# Patient Record
Sex: MALE | URBAN - METROPOLITAN AREA
[De-identification: names, ages, dates, MRNs, and addresses within clinical notes are randomized per-mention and may not be internally consistent; named-entity substitution may affect disease eponyms.]

---

## 2020-11-24 LAB — SARS-COV-2 N GENE NPH QL NAA+PROBE: NOT DETECTED

## 2022-03-03 ENCOUNTER — INPATIENT (INPATIENT)
Facility: HOSPITAL | Age: 27
LOS: 1 days | Discharge: HOME | DRG: 393 | End: 2022-03-05
Attending: SURGERY | Admitting: SURGERY
Payer: COMMERCIAL

## 2022-03-03 VITALS
WEIGHT: 134.92 LBS | SYSTOLIC BLOOD PRESSURE: 142 MMHG | HEIGHT: 71 IN | OXYGEN SATURATION: 97 % | HEART RATE: 84 BPM | DIASTOLIC BLOOD PRESSURE: 86 MMHG | TEMPERATURE: 97 F | RESPIRATION RATE: 18 BRPM

## 2022-03-03 DIAGNOSIS — K63.89 OTHER SPECIFIED DISEASES OF INTESTINE: ICD-10-CM

## 2022-03-03 DIAGNOSIS — Z98.890 OTHER SPECIFIED POSTPROCEDURAL STATES: Chronic | ICD-10-CM

## 2022-03-03 LAB
ALBUMIN SERPL ELPH-MCNC: 4.8 G/DL — SIGNIFICANT CHANGE UP (ref 3.3–5)
ALP SERPL-CCNC: 76 U/L — SIGNIFICANT CHANGE UP (ref 40–120)
ALT FLD-CCNC: 13 U/L — SIGNIFICANT CHANGE UP (ref 10–45)
ANION GAP SERPL CALC-SCNC: 13 MMOL/L — SIGNIFICANT CHANGE UP (ref 5–17)
APPEARANCE UR: CLEAR — SIGNIFICANT CHANGE UP
APTT BLD: 33.1 SEC — SIGNIFICANT CHANGE UP (ref 27.5–35.5)
AST SERPL-CCNC: 16 U/L — SIGNIFICANT CHANGE UP (ref 10–40)
BACTERIA # UR AUTO: 0 — SIGNIFICANT CHANGE UP
BASE EXCESS BLDV CALC-SCNC: -1.8 MMOL/L — SIGNIFICANT CHANGE UP (ref -2–2)
BASOPHILS # BLD AUTO: 0.02 K/UL — SIGNIFICANT CHANGE UP (ref 0–0.2)
BASOPHILS NFR BLD AUTO: 0.4 % — SIGNIFICANT CHANGE UP (ref 0–2)
BILIRUB SERPL-MCNC: 1.2 MG/DL — SIGNIFICANT CHANGE UP (ref 0.2–1.2)
BILIRUB UR-MCNC: NEGATIVE — SIGNIFICANT CHANGE UP
BLD GP AB SCN SERPL QL: NEGATIVE — SIGNIFICANT CHANGE UP
BUN SERPL-MCNC: 11 MG/DL — SIGNIFICANT CHANGE UP (ref 7–23)
CA-I SERPL-SCNC: 1.3 MMOL/L — SIGNIFICANT CHANGE UP (ref 1.15–1.33)
CALCIUM SERPL-MCNC: 9.8 MG/DL — SIGNIFICANT CHANGE UP (ref 8.4–10.5)
CHLORIDE BLDV-SCNC: 104 MMOL/L — SIGNIFICANT CHANGE UP (ref 96–108)
CHLORIDE SERPL-SCNC: 105 MMOL/L — SIGNIFICANT CHANGE UP (ref 96–108)
CO2 BLDV-SCNC: 24 MMOL/L — SIGNIFICANT CHANGE UP (ref 22–26)
CO2 SERPL-SCNC: 20 MMOL/L — LOW (ref 22–31)
COLOR SPEC: COLORLESS — SIGNIFICANT CHANGE UP
CREAT SERPL-MCNC: 0.97 MG/DL — SIGNIFICANT CHANGE UP (ref 0.5–1.3)
DIFF PNL FLD: NEGATIVE — SIGNIFICANT CHANGE UP
EGFR: 109 ML/MIN/1.73M2 — SIGNIFICANT CHANGE UP
EOSINOPHIL # BLD AUTO: 0.1 K/UL — SIGNIFICANT CHANGE UP (ref 0–0.5)
EOSINOPHIL NFR BLD AUTO: 2.1 % — SIGNIFICANT CHANGE UP (ref 0–6)
EPI CELLS # UR: 0 /HPF — SIGNIFICANT CHANGE UP
GAS PNL BLDV: 138 MMOL/L — SIGNIFICANT CHANGE UP (ref 136–145)
GAS PNL BLDV: SIGNIFICANT CHANGE UP
GLUCOSE BLDV-MCNC: 95 MG/DL — SIGNIFICANT CHANGE UP (ref 70–99)
GLUCOSE SERPL-MCNC: 102 MG/DL — HIGH (ref 70–99)
GLUCOSE UR QL: NEGATIVE — SIGNIFICANT CHANGE UP
HCO3 BLDV-SCNC: 23 MMOL/L — SIGNIFICANT CHANGE UP (ref 22–29)
HCT VFR BLD CALC: 43.5 % — SIGNIFICANT CHANGE UP (ref 39–50)
HCT VFR BLDA CALC: 45 % — SIGNIFICANT CHANGE UP (ref 39–51)
HGB BLD CALC-MCNC: 15 G/DL — SIGNIFICANT CHANGE UP (ref 12.6–17.4)
HGB BLD-MCNC: 14.8 G/DL — SIGNIFICANT CHANGE UP (ref 13–17)
HYALINE CASTS # UR AUTO: 0 /LPF — SIGNIFICANT CHANGE UP (ref 0–2)
IMM GRANULOCYTES NFR BLD AUTO: 0.4 % — SIGNIFICANT CHANGE UP (ref 0–1.5)
INR BLD: 1.23 RATIO — HIGH (ref 0.88–1.16)
KETONES UR-MCNC: NEGATIVE — SIGNIFICANT CHANGE UP
LACTATE BLDV-MCNC: 1.3 MMOL/L — SIGNIFICANT CHANGE UP (ref 0.7–2)
LEUKOCYTE ESTERASE UR-ACNC: NEGATIVE — SIGNIFICANT CHANGE UP
LIDOCAIN IGE QN: 17 U/L — SIGNIFICANT CHANGE UP (ref 7–60)
LYMPHOCYTES # BLD AUTO: 1.66 K/UL — SIGNIFICANT CHANGE UP (ref 1–3.3)
LYMPHOCYTES # BLD AUTO: 35.1 % — SIGNIFICANT CHANGE UP (ref 13–44)
MCHC RBC-ENTMCNC: 30.3 PG — SIGNIFICANT CHANGE UP (ref 27–34)
MCHC RBC-ENTMCNC: 34 GM/DL — SIGNIFICANT CHANGE UP (ref 32–36)
MCV RBC AUTO: 89.1 FL — SIGNIFICANT CHANGE UP (ref 80–100)
MONOCYTES # BLD AUTO: 0.54 K/UL — SIGNIFICANT CHANGE UP (ref 0–0.9)
MONOCYTES NFR BLD AUTO: 11.4 % — SIGNIFICANT CHANGE UP (ref 2–14)
NEUTROPHILS # BLD AUTO: 2.39 K/UL — SIGNIFICANT CHANGE UP (ref 1.8–7.4)
NEUTROPHILS NFR BLD AUTO: 50.6 % — SIGNIFICANT CHANGE UP (ref 43–77)
NITRITE UR-MCNC: NEGATIVE — SIGNIFICANT CHANGE UP
NRBC # BLD: 0 /100 WBCS — SIGNIFICANT CHANGE UP (ref 0–0)
PCO2 BLDV: 39 MMHG — LOW (ref 42–55)
PH BLDV: 7.38 — SIGNIFICANT CHANGE UP (ref 7.32–7.43)
PH UR: 7 — SIGNIFICANT CHANGE UP (ref 5–8)
PLATELET # BLD AUTO: 255 K/UL — SIGNIFICANT CHANGE UP (ref 150–400)
PO2 BLDV: 55 MMHG — HIGH (ref 25–45)
POTASSIUM BLDV-SCNC: 3.8 MMOL/L — SIGNIFICANT CHANGE UP (ref 3.5–5.1)
POTASSIUM SERPL-MCNC: 4 MMOL/L — SIGNIFICANT CHANGE UP (ref 3.5–5.3)
POTASSIUM SERPL-SCNC: 4 MMOL/L — SIGNIFICANT CHANGE UP (ref 3.5–5.3)
PROT SERPL-MCNC: 7.1 G/DL — SIGNIFICANT CHANGE UP (ref 6–8.3)
PROT UR-MCNC: NEGATIVE — SIGNIFICANT CHANGE UP
PROTHROM AB SERPL-ACNC: 14.3 SEC — HIGH (ref 10.5–13.4)
RBC # BLD: 4.88 M/UL — SIGNIFICANT CHANGE UP (ref 4.2–5.8)
RBC # FLD: 13.6 % — SIGNIFICANT CHANGE UP (ref 10.3–14.5)
RBC CASTS # UR COMP ASSIST: 0 /HPF — SIGNIFICANT CHANGE UP (ref 0–4)
RH IG SCN BLD-IMP: NEGATIVE — SIGNIFICANT CHANGE UP
SAO2 % BLDV: 85.8 % — SIGNIFICANT CHANGE UP (ref 67–88)
SARS-COV-2 RNA SPEC QL NAA+PROBE: SIGNIFICANT CHANGE UP
SODIUM SERPL-SCNC: 138 MMOL/L — SIGNIFICANT CHANGE UP (ref 135–145)
SP GR SPEC: 1.04 — SIGNIFICANT CHANGE UP (ref 1.01–1.02)
UROBILINOGEN FLD QL: NEGATIVE — SIGNIFICANT CHANGE UP
WBC # BLD: 4.73 K/UL — SIGNIFICANT CHANGE UP (ref 3.8–10.5)
WBC # FLD AUTO: 4.73 K/UL — SIGNIFICANT CHANGE UP (ref 3.8–10.5)
WBC UR QL: 0 /HPF — SIGNIFICANT CHANGE UP (ref 0–5)

## 2022-03-03 PROCEDURE — 99285 EMERGENCY DEPT VISIT HI MDM: CPT

## 2022-03-03 PROCEDURE — 99221 1ST HOSP IP/OBS SF/LOW 40: CPT

## 2022-03-03 PROCEDURE — 99232 SBSQ HOSP IP/OBS MODERATE 35: CPT

## 2022-03-03 PROCEDURE — 74177 CT ABD & PELVIS W/CONTRAST: CPT | Mod: 26,MA

## 2022-03-03 RX ORDER — SODIUM CHLORIDE 9 MG/ML
1000 INJECTION, SOLUTION INTRAVENOUS
Refills: 0 | Status: DISCONTINUED | OUTPATIENT
Start: 2022-03-03 | End: 2022-03-03

## 2022-03-03 RX ORDER — PIPERACILLIN AND TAZOBACTAM 4; .5 G/20ML; G/20ML
3.38 INJECTION, POWDER, LYOPHILIZED, FOR SOLUTION INTRAVENOUS ONCE
Refills: 0 | Status: COMPLETED | OUTPATIENT
Start: 2022-03-03 | End: 2022-03-03

## 2022-03-03 RX ORDER — PIPERACILLIN AND TAZOBACTAM 4; .5 G/20ML; G/20ML
3.38 INJECTION, POWDER, LYOPHILIZED, FOR SOLUTION INTRAVENOUS ONCE
Refills: 0 | Status: DISCONTINUED | OUTPATIENT
Start: 2022-03-03 | End: 2022-03-03

## 2022-03-03 RX ORDER — ENOXAPARIN SODIUM 100 MG/ML
40 INJECTION SUBCUTANEOUS EVERY 24 HOURS
Refills: 0 | Status: DISCONTINUED | OUTPATIENT
Start: 2022-03-03 | End: 2022-03-05

## 2022-03-03 RX ORDER — PIPERACILLIN AND TAZOBACTAM 4; .5 G/20ML; G/20ML
3.38 INJECTION, POWDER, LYOPHILIZED, FOR SOLUTION INTRAVENOUS EVERY 8 HOURS
Refills: 0 | Status: DISCONTINUED | OUTPATIENT
Start: 2022-03-03 | End: 2022-03-05

## 2022-03-03 RX ADMIN — PIPERACILLIN AND TAZOBACTAM 25 GRAM(S): 4; .5 INJECTION, POWDER, LYOPHILIZED, FOR SOLUTION INTRAVENOUS at 16:02

## 2022-03-03 RX ADMIN — PIPERACILLIN AND TAZOBACTAM 3.38 GRAM(S): 4; .5 INJECTION, POWDER, LYOPHILIZED, FOR SOLUTION INTRAVENOUS at 12:05

## 2022-03-03 RX ADMIN — PIPERACILLIN AND TAZOBACTAM 200 GRAM(S): 4; .5 INJECTION, POWDER, LYOPHILIZED, FOR SOLUTION INTRAVENOUS at 11:16

## 2022-03-03 NOTE — ED ADULT NURSE REASSESSMENT NOTE - NS ED NURSE REASSESS COMMENT FT1
Report given to receiving to CANDY Ward patient is in no acute distress. Patient vital signs stable, plan of care explained.

## 2022-03-03 NOTE — H&P ADULT - ASSESSMENT
26M presenting with ascending pneumatosis intestinalis w/ small area of contained perforation with free intraperitoneal air.    PLAN:  - Admit to Red Team Surgery under Dr. Phillip Mariscal.  - Clear liquid diet  - IV antibiotics: Zosyn  - ID consultation  - GI consultation  - Pain control as needed  - DVT ppx    Discussed with colorectal fellow.    MICHAELA Gavin, PGY-2   [facility]   Red Team Surgery   [id number]

## 2022-03-03 NOTE — ED ADULT NURSE NOTE - OBJECTIVE STATEMENT
Patient  is  alert  and oriented  x3. Color is good  and skin warm to touch  .  He  is c/o  mid abdominal pain and diarrhea  x 9 days .Patient  was recently admitted at  a  different  hospital  for  the  same  problem.  He  is  feeling  much  better now.

## 2022-03-03 NOTE — CONSULT NOTE ADULT - ASSESSMENT
26 year old, no recent travel.  looks remarkable well, normal labs and inflammatory markers.  minimal eosinophilia in outside labs.  currently asymptomatic  CT reviewed with radiology and discussed with Dr. GOMEZ.      The bowel looks normal and the air is adjacent to the ascending colon. The first concern would be IBD, but there is no diagnosis at present He does have a history of frequent bowel movements with blood in the past.    no travel    suggest     baseline QuantiFeron gold  stool for o and   P    GI PCR  negative in [place].   serology for trichomonas  and e. histolytic   continue zon  GI consult. 
27 yo M w/ no PMHx p/w abd pain x 8 days, found to have pneumatosis intestinalis of ascending colon    # abd pain - likely secondary to transmural colonic process causing CT findings of pneumatosis intestinalis and pneumoperitoneum. DDx includes ischemic injury, such as isolated right sided colonic ischemia. Patient has no known risk factors for colonic ischemia. CT w/ IV contrast demonstrates patent vasculature, however a mesenteric thrombus may have been present and since resolved. However, patient's clinical stability, lack of leukocytosis/lactate, argue against isolated R sided colonic ischemia. DDx includes inflammatory conditions, such as Crohn's disease. His history of loose stools, poor weight growth, may be indicators of a chronic inflammatory condition. Patient will benefit from full colonoscopy to better visualize the area and aid in diagnosis. However, given patient's current contained perforation, with localized pneumoperitoneum, will defer endoscopic evaluation at this time.     Recommendations  - f/u surgery recommendations  - c/w abx, IVF  - serial abd exams  - send inflammatory markers (ESR, CRP, fecal calprotectin)  - once perforation has resolved, will wait 4-6 weeks and evaluate endoscopically   - rest of care per primary team    GI will continue to follow.     All recommendations are tentative until note is attested by attending.     Thanh Pena, PGY5  Gastroenterology/Hepatology Fellow  Available on [employer] Teams  [phone] ([employer] Short Range Pager)  [phone] (Long Range Pager)    After 5pm, please contact the on-call GI fellow. [phone]

## 2022-03-03 NOTE — CONSULT NOTE ADULT - ATTENDING COMMENTS
Agree with above. 25 yo with 8 days of worsening abdominal pain and hematochezia. Outside CT w ascending colon inflammation w questionable perforation. Patient treated with ABXs, discharged and flew back to [place]. Current CT with pneumatosis and pneumoperitoneum. Patient reporting chronic symptoms of loose stools and rectal bleeding thought to be from hemorrhoids. Agree with eventual elective colonoscopy to evaluate for possible IBD/Crohn's. For now, IV ABXs, serial AXRs, CRP and fecal calprotectin. Surgical management.

## 2022-03-03 NOTE — ED PROVIDER NOTE - PROGRESS NOTE DETAILS
ATTG: : repeat ct done here and compared to prior osh imaging. still with small amount pneumatosis intestinalis, although wbc wbl an dno fever, discussed with ID and rec iv abx and surg eval. seen by Surg. Dr. Burgos and evaluated, agree with plan for iv abx and will admit to hospital for further care.

## 2022-03-03 NOTE — CHART NOTE - NSCHARTNOTEFT_GEN_A_CORE
Routine bedside evaluation was performed to establish a baseline for the evening.   Patient is resting comfortably in bed watching TV. No pain, nausea, vomiting.   He states he has been passing flatus and provided a stool sample.  Abdomen soft nondistended nontender to palpation in all four quadrants.  He will continue to monitor.    Red Team Surgery   [id number] Routine bedside evaluation was performed to establish a baseline for the evening.   Patient is resting comfortably in bed watching TV. No pain, nausea, vomiting.   He states he has been passing flatus and provided a stool sample.  Abdomen soft nondistended nontender to palpation in all four quadrants.  We will continue to monitor.    Red Team Surgery   [id number]

## 2022-03-03 NOTE — ED PROVIDER NOTE - CLINICAL SUMMARY MEDICAL DECISION MAKING FREE TEXT BOX
ATTG: : abd pain with abnormal ct OSH. will upload imaging here, check ct scan, check labs, ivf, pain medication, re livia for dispo

## 2022-03-03 NOTE — PATIENT PROFILE ADULT - FALL HARM RISK - UNIVERSAL INTERVENTIONS
Bed in lowest position, wheels locked, appropriate side rails in place/Call bell, personal items and telephone in reach/Instruct patient to call for assistance before getting out of bed or chair/Non-slip footwear when patient is out of bed/[place] to call system/Physically safe environment - no spills, clutter or unnecessary equipment/Purposeful Proactive Rounding/Room/bathroom lighting operational, light cord in reach

## 2022-03-03 NOTE — CONSULT NOTE ADULT - SUBJECTIVE AND OBJECTIVE BOX
HPI:  25 yo M w/ no PMHx p/w abd pain and diarrhea x 8 days.     Patient was skiing in [place] for past 3 weeks. Beginning 8 days ago, began to develop lower abdomen, suprapubic, pain. + bloating. No nausea/vomiting. During this time also having some diarrhea. Pain worsened over the initial few days. On 22 patient had severe pain, unable to sleep, + hematochezia, so presented to local ED. No fever during this time. At the local hospital, CT showed ascending colon inflammation w/ possible perforation. Patient given IV abx, discharged, flew to [place], and presented to [facility] ED.     Patient states at baseline he has no abd pain, has 3-4 loose/semi-loose bowel movements per day, intermittenly w/ blood on toilet paper attributed to hemorrhoids. Approximately 2 years ago, he had increased bloody stools, underwent flex sig, told he has hemorrhoids. He states he has trouble gaining weight. No arthrlagias/myagias. No vision problems. No FHx of IBD. +FHx of autoimmune disorders (sister w/ Hashimotos, two aunts w/ RA).     No prior EGD. No medications. Social EtOH, prior cocaine use (none in past 6 months), no IVDU.     On Admission, patient HDS. Labs wnl. CT A/P showing pneumotosis intestinalis w/ small pockets of pneumoperitoneum. Patient given zosyn and admitted to surgery.     Allergies:  No Known Allergies      Home Medications:    Hospital Medications:  piperacillin/tazobactam IVPB.. 3.375 Gram(s) IV Intermittent once      PMHX/PSHX:      Family history:      Denies family history of colon cancer/polyps, stomach cancer/polyps, pancreatic cancer/masses, liver cancer/disease, ovarian cancer and endometrial cancer.    Social History:   Tob: Denies  EtOH: Denies  Illicit Drugs: Denies    ROS:     General:  No wt loss, fevers, chills, night sweats, fatigue  Eyes:  Good vision, no reported pain  ENT:  No sore throat, pain, runny nose, dysphagia  CV:  No pain, palpitations, hypo/hypertension  Pulm:  No dyspnea, cough, tachypnea, wheezing  GI:  see HPI  :  No pain, bleeding, incontinence, nocturia  Muscle:  No pain, weakness  Neuro:  No weakness, tingling, memory problems  Psych:  No fatigue, insomnia, mood problems, depression  Endocrine:  No polyuria, polydipsia, cold/heat intolerance  Heme:  No petechiae, ecchymosis, easy bruisability  Skin:  No rash, tattoos, scars, edema    PHYSICAL EXAM:     GENERAL:  No acute distress  HEENT:  NCAT, no scleral icterus   CHEST:  no respiratory distress  HEART:  Regular rate and rhythm  ABDOMEN:  Soft, non-tender, non-distended, normoactive bowel sounds,  no masses  EXTREMITIES: No edema  SKIN:  No rash/erythema/ecchymoses/petechiae/wounds/abscess/warm/dry  NEURO:  Alert and oriented x 3, no asterixis    Vital Signs:  Vital Signs Last 24 Hrs  T(C): 36.7 (03 Mar 2022 12:39), Max: 36.7 (03 Mar 2022 12:39)  T(F): 98.1 (03 Mar 2022 12:39), Max: 98.1 (03 Mar 2022 12:39)  HR: 62 (03 Mar 2022 12:39) (62 - 84)  BP: 134/83 (03 Mar 2022 12:39) (134/83 - 142/86)  BP(mean): --  RR: 18 (03 Mar 2022 12:39) (18 - 18)  SpO2: 100% (03 Mar 2022 12:39) (97% - 100%)  Daily Height in cm: 180.34 (03 Mar 2022 09:56)    Daily     LABS:                        14.8   4.73  )-----------( 255      ( 03 Mar 2022 10:55 )             43.5     Mean Cell Volume: 89.1 fl (22 @ 10:55)        138  |  105  |  11  ----------------------------<  102<H>  4.0   |  20<L>  |  0.97    Ca    9.8      03 Mar 2022 10:55    TPro  7.1  /  Alb  4.8  /  TBili  1.2  /  DBili  x   /  AST  16  /  ALT  13  /  AlkPhos  76      LIVER FUNCTIONS - ( 03 Mar 2022 10:55 )  Alb: 4.8 g/dL / Pro: 7.1 g/dL / ALK PHOS: 76 U/L / ALT: 13 U/L / AST: 16 U/L / GGT: x           PT/INR - ( 03 Mar 2022 13:06 )   PT: 14.3 sec;   INR: 1.23 ratio         PTT - ( 03 Mar 2022 13:06 )  PTT:33.1 sec  Urinalysis Basic - ( 03 Mar 2022 13:38 )    Color: Colorless / Appearance: Clear / S.044 / pH: x  Gluc: x / Ketone: Negative  / Bili: Negative / Urobili: Negative   Blood: x / Protein: Negative / Nitrite: Negative   Leuk Esterase: Negative / RBC: 0 /hpf / WBC 0 /HPF   Sq Epi: x / Non Sq Epi: 0 /hpf / Bacteria: 0.0      Amylase Serum--      Lipase serum17       Ammonia--                          14.8   4.73  )-----------( 255      ( 03 Mar 2022 10:55 )             43.5       Imaging:  CT A/P 3/3/22  FINDINGS:  LOWER CHEST: Within normal limits.    LIVER: A subcentimeter hypodensity in segment 8 that is too small to   characterize.  BILE DUCTS: Normal caliber.  GALLBLADDER: Within normal limits.  SPLEEN: Within normal limits.  PANCREAS: Within normal limits.  ADRENALS: Within normal limits.  KIDNEYS/URETERS: Within normal limits.    BLADDER: Within normal limits.  REPRODUCTIVE ORGANS: Normal size prostate.    BOWEL/PERITONEUM: Mild pneumatosis intestinalis involving the ascending   colon with small adjacent pneumoperitoneum. Small liquid stool in the   colon. No bowel obstruction. Appendix is normal. No ascites. No abscess.  VESSELS: Within normal limits. Patent celiac axis, SMA, IVANA, portal   veins, and SMV.  RETROPERITONEUM/LYMPH NODES: No lymphadenopathy.  ABDOMINAL WALL: Within normal limits.  BONES: Within normal limits.    IMPRESSION:  Small pneumatosis intestinalis of the ascending colon with small adjacent   localized pneumoperitoneum. Differential for pneumatosis intestinalis   includes both benign and life-threatening conditions, such as bowel   ischemia. No bowel wall thickening or hypoenhancement and the mesenteric   vasculature is patent. No abscess. Correlate clinically.          
Patient is a 26y old  Male who presents with a chief complaint of   HPI: presented a week ago with abdominal pain and blood  in stool while on skiing trip  was admitted to local hospital and found to have pneumatosis  and small amount of free air. was treated with zosyn and flew back home         PAST MEDICAL & SURGICAL HISTORY:      Social history:    FAMILY HISTORY:    REVIEW OF SYSTEMS  General:	Denies any malaise fatigue or chills. Fevers absent    Skin:No rash  	  Ophthalmologic:Denies any visual complaints,discharge redness or photophobia  	  ENMT:No nasal discharge,headache,sinus congestion or throat pain.No dental complaints    Respiratory and Thorax:No cough,sputum or chest pain.Denies shortness of breath  	  Cardiovascular:	No chest pain,palpitaions or dizziness    Gastrointestinal:	NO nausea,abdominal pain or diarrhea.    Genitourinary:	No dysuria,frequency. No flank pain    Musculoskeletal:	No joint swelling or pain.No weakness    Neurological:No confusion,diziness.No extremity weakness.No bladder or bowel incontinence	    Psychiatric:No delusions or hallucinations	    Hematology/Lymphatics:	No LN swelling.No gum bleeding     Endocrine:	No recent weight gain or loss.No abnormal heat/cold intolerance    Allergic/Immunologic:	No hives or rash   Allergies    No Known Allergies    Intolerances        Antimicrobials:    piperacillin/tazobactam IVPB.. 3.375 Gram(s) IV Intermittent once        Vital Signs Last 24 Hrs  T(C): 36.7 (03 Mar 2022 12:39), Max: 36.7 (03 Mar 2022 12:39)  T(F): 98.1 (03 Mar 2022 12:39), Max: 98.1 (03 Mar 2022 12:39)  HR: 62 (03 Mar 2022 12:39) (62 - 84)  BP: 134/83 (03 Mar 2022 12:39) (134/83 - 142/86)  BP(mean): --  RR: 18 (03 Mar 2022 12:39) (18 - 18)  SpO2: 100% (03 Mar 2022 12:39) (97% - 100%)    PHYSICAL EXAM:Pleasant patient in no acute distress.      Constitutional:Comfortable.Awake and alert  No cachexia     Eyes:PERRL EOMI.NO discharge or conjunctival injection    ENMT:No sinus tenderness.No thrush.No pharyngeal exudate or erythema.Fair dental hygiene    Neck:Supple,No LN,no JVD      Respiratory:Good air entry bilaterally,CTA    Cardiovascular:S1 S2 wnl, No murmurs,rub or gallops    Gastrointestinal:Soft BS(+) no tenderness no masses ,No rebound or guarding    Genitourinary:No CVA tendereness     Rectal:    Extremities:No cyanosis,clubbing or edema.    Vascular:peripheral pulses felt    Neurological:AAO X 3,No grossly focal deficits    Skin:No rash     Lymph Nodes:No palpable LNs    Musculoskeletal:No joint swelling or LOM    Psychiatric:Affect normal.                                14.8   4.73  )-----------( 255      ( 03 Mar 2022 10:55 )             43.5         03-03    138  |  105  |  11  ----------------------------<  102<H>  4.0   |  20<L>  |  0.97    Ca    9.8      03 Mar 2022 10:55    TPro  7.1  /  Alb  4.8  /  TBili  1.2  /  DBili  x   /  AST  16  /  ALT  13  /  AlkPhos  76  03-03      RECENT CULTURES:      MICROBIOLOGY:          Radiology:      Assessment:        Recommendations and Plan:    Pager [phone]  After 5 pm/weekends or if no response :[phone]

## 2022-03-03 NOTE — ED PROVIDER NOTE - OBJECTIVE STATEMENT
27yo M no significant PMH presenting with complaints of abd pain. pt with 1wk of abdominal pain, onset while in [place], with bloody diarrhea. no f/c, n/v. seen at hospital there with CT finding pneumatosis and free air. treated with zosyn. pt brisa home yesterday. reports improvement of abd pain, now with bloated feeling and no longer having bloody diarrhea.     patients mother is infectious disease physician, states that pt has had intermittent bloody diarrhea for years, had has been evaluated for IBD without official diagnosis.

## 2022-03-03 NOTE — H&P ADULT - NSHPLABSRESULTS_GEN_ALL_CORE
LABS:                        14.8   4.73  )-----------( 255      ( 03 Mar 2022 10:55 )             43.5     03 Mar 2022 10:55    138    |  105    |  11     ----------------------------<  102    4.0     |  20     |  0.97     Ca    9.8        03 Mar 2022 10:55    TPro  7.1    /  Alb  4.8    /  TBili  1.2    /  DBili  x      /  AST  16     /  ALT  13     /  AlkPhos  76     03 Mar 2022 10:55    PT/INR - ( 03 Mar 2022 13:06 )   PT: 14.3 sec;   INR: 1.23 ratio         PTT - ( 03 Mar 2022 13:06 )  PTT:33.1 sec  CAPILLARY BLOOD GLUCOSE            LIVER FUNCTIONS - ( 03 Mar 2022 10:55 )  Alb: 4.8 g/dL / Pro: 7.1 g/dL / ALK PHOS: 76 U/L / ALT: 13 U/L / AST: 16 U/L / GGT: x             Urinalysis Basic - ( 03 Mar 2022 13:38 )    Color: Colorless / Appearance: Clear / S.044 / pH: x  Gluc: x / Ketone: Negative  / Bili: Negative / Urobili: Negative   Blood: x / Protein: Negative / Nitrite: Negative   Leuk Esterase: Negative / RBC: 0 /hpf / WBC 0 /HPF   Sq Epi: x / Non Sq Epi: 0 /hpf / Bacteria: 0.0      CT Abdomen and Pelvis w/ IV Cont (22 @ 11:04)    FINDINGS:  LOWER CHEST: Within normal limits.    LIVER: A subcentimeter hypodensity in segment 8 that is too small to   characterize.  BILE DUCTS: Normal caliber.  GALLBLADDER: Within normal limits.  SPLEEN: Within normal limits.  PANCREAS: Within normal limits.  ADRENALS: Within normal limits.  KIDNEYS/URETERS: Within normal limits.    BLADDER: Within normal limits.  REPRODUCTIVE ORGANS: Normal size prostate.    BOWEL/PERITONEUM: Mild pneumatosis intestinalis involving the ascending   colon with small adjacent pneumoperitoneum. Small liquid stool in the   colon. No bowel obstruction. Appendix is normal. No ascites. No abscess.  VESSELS: Within normal limits. Patent celiac axis, SMA, IVANA, portal   veins, and SMV.  RETROPERITONEUM/LYMPH NODES: No lymphadenopathy.  ABDOMINAL WALL: Within normal limits.  BONES: Within normal limits.    IMPRESSION:  Small pneumatosis intestinalis of the ascending colon with small adjacent   localized pneumoperitoneum. Differential for pneumatosis intestinalis   includes both benign and life-threatening conditions, such as bowel   ischemia. No bowel wall thickening or hypoenhancement and the mesenteric   vasculature is patent. No abscess. Correlate clinically.

## 2022-03-03 NOTE — H&P ADULT - NSHPPHYSICALEXAM_GEN_ALL_CORE
VITAL SIGNS:  Vital Signs Last 24 Hrs  T(C): 36.7 (03 Mar 2022 12:39), Max: 36.7 (03 Mar 2022 12:39)  T(F): 98.1 (03 Mar 2022 12:39), Max: 98.1 (03 Mar 2022 12:39)  HR: 62 (03 Mar 2022 12:39) (62 - 84)  BP: 134/83 (03 Mar 2022 12:39) (134/83 - 142/86)  BP(mean): --  RR: 18 (03 Mar 2022 12:39) (18 - 18)  SpO2: 100% (03 Mar 2022 12:39) (97% - 100%)      PHYSICAL EXAM:    General: NAD, Sitting in bed comfortably  HEENT: NC/AT, EOMI  Neck: Soft, supple  Cardio: RRR, nml S1/S2  Resp: Good effort, CTA b/l  GI/Abd: Soft, NT/ND, no rebound/guarding, no masses palpated  Musculoskeletal: All 4 extremities moving spontaneously, no limitations

## 2022-03-03 NOTE — ED PROVIDER NOTE - ATTENDING CONTRIBUTION TO CARE
27 y/o m presents for eval of abd pain. He just returned from [place] where he was experiencing abd pain and went to and ED, found to have pneumatosis intestinalis. He was treated with Zosyn and improved without surgical intervention. He spoke with his mother who is an infectious disease Physician at [facility] and upon returning was brought in for further evaluation. no fevers and pain is better. no urinary complaints. no trauma. no new medications. had prior episodes of abd pain and was cared for by a GI MD who performed a sigmoidoscopy a few years ago.   Gen.  no acute distress, well appearing male  HEENT:  perrl eomi mmm  Lungs:   b/l bs  CVS: S1S2   Abd;  no guarding no distention soft, non tender   Ext: no edema no erythema  Neuro: aaox3 no focal deficit  MSK: strength 5/5 b/l upper and lower ext.

## 2022-03-04 LAB
ALBUMIN SERPL ELPH-MCNC: 4.5 G/DL — SIGNIFICANT CHANGE UP (ref 3.3–5)
ALP SERPL-CCNC: 71 U/L — SIGNIFICANT CHANGE UP (ref 40–120)
ALT FLD-CCNC: 10 U/L — SIGNIFICANT CHANGE UP (ref 10–45)
ANION GAP SERPL CALC-SCNC: 12 MMOL/L — SIGNIFICANT CHANGE UP (ref 5–17)
AST SERPL-CCNC: 11 U/L — SIGNIFICANT CHANGE UP (ref 10–40)
BILIRUB SERPL-MCNC: 1 MG/DL — SIGNIFICANT CHANGE UP (ref 0.2–1.2)
BUN SERPL-MCNC: 11 MG/DL — SIGNIFICANT CHANGE UP (ref 7–23)
CALCIUM SERPL-MCNC: 9.3 MG/DL — SIGNIFICANT CHANGE UP (ref 8.4–10.5)
CHLORIDE SERPL-SCNC: 105 MMOL/L — SIGNIFICANT CHANGE UP (ref 96–108)
CO2 SERPL-SCNC: 23 MMOL/L — SIGNIFICANT CHANGE UP (ref 22–31)
CREAT SERPL-MCNC: 1.15 MG/DL — SIGNIFICANT CHANGE UP (ref 0.5–1.3)
CULTURE RESULTS: SIGNIFICANT CHANGE UP
EGFR: 89 ML/MIN/1.73M2 — SIGNIFICANT CHANGE UP
GLUCOSE SERPL-MCNC: 93 MG/DL — SIGNIFICANT CHANGE UP (ref 70–99)
HCT VFR BLD CALC: 41.8 % — SIGNIFICANT CHANGE UP (ref 39–50)
HGB BLD-MCNC: 14.2 G/DL — SIGNIFICANT CHANGE UP (ref 13–17)
MAGNESIUM SERPL-MCNC: 1.9 MG/DL — SIGNIFICANT CHANGE UP (ref 1.6–2.6)
MCHC RBC-ENTMCNC: 30.3 PG — SIGNIFICANT CHANGE UP (ref 27–34)
MCHC RBC-ENTMCNC: 34 GM/DL — SIGNIFICANT CHANGE UP (ref 32–36)
MCV RBC AUTO: 89.1 FL — SIGNIFICANT CHANGE UP (ref 80–100)
NRBC # BLD: 0 /100 WBCS — SIGNIFICANT CHANGE UP (ref 0–0)
PHOSPHATE SERPL-MCNC: 4.5 MG/DL — SIGNIFICANT CHANGE UP (ref 2.5–4.5)
PLATELET # BLD AUTO: 242 K/UL — SIGNIFICANT CHANGE UP (ref 150–400)
POTASSIUM SERPL-MCNC: 4.1 MMOL/L — SIGNIFICANT CHANGE UP (ref 3.5–5.3)
POTASSIUM SERPL-SCNC: 4.1 MMOL/L — SIGNIFICANT CHANGE UP (ref 3.5–5.3)
PROT SERPL-MCNC: 6.7 G/DL — SIGNIFICANT CHANGE UP (ref 6–8.3)
RBC # BLD: 4.69 M/UL — SIGNIFICANT CHANGE UP (ref 4.2–5.8)
RBC # FLD: 13.7 % — SIGNIFICANT CHANGE UP (ref 10.3–14.5)
SODIUM SERPL-SCNC: 140 MMOL/L — SIGNIFICANT CHANGE UP (ref 135–145)
SPECIMEN SOURCE: SIGNIFICANT CHANGE UP
SPECIMEN SOURCE: SIGNIFICANT CHANGE UP
T VAGINALIS RRNA SPEC QL NAA+PROBE: SIGNIFICANT CHANGE UP
WBC # BLD: 6.23 K/UL — SIGNIFICANT CHANGE UP (ref 3.8–10.5)
WBC # FLD AUTO: 6.23 K/UL — SIGNIFICANT CHANGE UP (ref 3.8–10.5)

## 2022-03-04 PROCEDURE — 99232 SBSQ HOSP IP/OBS MODERATE 35: CPT | Mod: GC

## 2022-03-04 PROCEDURE — 99232 SBSQ HOSP IP/OBS MODERATE 35: CPT

## 2022-03-04 RX ADMIN — PIPERACILLIN AND TAZOBACTAM 25 GRAM(S): 4; .5 INJECTION, POWDER, LYOPHILIZED, FOR SOLUTION INTRAVENOUS at 16:21

## 2022-03-04 RX ADMIN — PIPERACILLIN AND TAZOBACTAM 25 GRAM(S): 4; .5 INJECTION, POWDER, LYOPHILIZED, FOR SOLUTION INTRAVENOUS at 08:47

## 2022-03-04 RX ADMIN — PIPERACILLIN AND TAZOBACTAM 25 GRAM(S): 4; .5 INJECTION, POWDER, LYOPHILIZED, FOR SOLUTION INTRAVENOUS at 00:43

## 2022-03-04 RX ADMIN — ENOXAPARIN SODIUM 40 MILLIGRAM(S): 100 INJECTION SUBCUTANEOUS at 00:43

## 2022-03-04 NOTE — PROGRESS NOTE ADULT - ATTENDING COMMENTS
Agree with above. Patient eating and tolerating a regular diet. Denies abdominal pain. Continue ABxs. Will need elective outpatient colonoscopy.

## 2022-03-04 NOTE — PROGRESS NOTE ADULT - SUBJECTIVE AND OBJECTIVE BOX
SURGERY PROGRESS NOTE  Hospital Day #22 (1d)    Overnight, no acute events.   Pt seen and examined at bedside.      PMHx   Vital Signs Last 24 Hrs  T(C): 36.9 (04 Mar 2022 01:11), Max: 37.3 (03 Mar 2022 21:52)  T(F): 98.4 (04 Mar 2022 01:11), Max: 99.1 (03 Mar 2022 21:52)  HR: 60 (04 Mar 2022 01:11) (60 - 100)  BP: 119/81 (04 Mar 2022 01:11) (112/76 - 143/87)  RR: 18 (04 Mar 2022 01:11) (18 - 19)  SpO2: 98% (04 Mar 2022 01:11) (97% - 100%)    PHYSICAL EXAM:    General: NAD, Sitting in bed comfortably  HEENT: NC/AT, EOMI  Neck: Soft, supple  Cardio: RRR, nml S1/S2  Resp: Good effort, CTA b/l  GI/Abd: Soft, NT/ND, no rebound/guarding, no masses palpated  Musculoskeletal: All 4 extremities moving spontaneously, no limitations    MEDICATIONS:   DVT PROPHYLAXIS: enoxaparin Injectable 40 milliGRAM(s)  ANTIBIOTICS: piperacillin/tazobactam IVPB.. 3.375 Gram(s)    LAB/STUDIES:             14.8   4.73  )-----------( 255      ( 03 Mar 2022 10:55 )             43.5    138  |  105  |  11  ----------------------------<  102<H>  4.0   |  20<L>  |  0.97  Ca    9.8      03 Mar 2022 10:55  TPro  7.1  /  Alb  4.8  /  TBili  1.2  /  DBili  x   /  AST  16  /  ALT  13  /  AlkPhos  76  -   PT/INR - ( 03 Mar 2022 13:06 )   PT: 14.3 sec;   INR: 1.23 ratio      PTT - ( 03 Mar 2022 13:06 )  PTT:33.1 sec LIVER FUNCTIONS - ( 03 Mar 2022 10:55 )  Alb: 4.8 g/dL / Pro: 7.1 g/dL / ALK PHOS: 76 U/L / ALT: 13 U/L / AST: 16 U/L / GGT: x          Urinalysis Basic - ( 03 Mar 2022 13:38 )    Color: Colorless / Appearance: Clear / S.044 / pH: x  Gluc: x / Ketone: Negative  / Bili: Negative / Urobili: Negative   Blood: x / Protein: Negative / Nitrite: Negative   Leuk Esterase: Negative / RBC: 0 /hpf / WBC 0 /HPF   Sq Epi: x / Non Sq Epi: 0 /hpf / Bacteria: 0.0    GI PCR Panel, Stool (collected 03 Mar 2022 22:00)  Source: .Stool Feces  Final Report (04 Mar 2022 02:13):    GI PCR Results: NOT detected    *******Please Note:*******    GI panel PCR evaluates for:    Campylobacter, Plesiomonas shigelloides, Salmonella,    Vibrio, Yersinia enterocolitica, Enteroaggregative    Escherichia coli (EAEC), Enteropathogenic E.coli (EPEC),    Enterotoxigenic E. coli (ETEC) lt/st, Shiga-like    toxin-producing E. coli (STEC) stx1/stx2,    Shigella/ Enteroinvasive E. coli (EIEC), Cryptosporidium,    Cyclospora cayetanensis, Entamoeba histolytica,    Giardia lamblia, Adenovirus F 40/41, Astrovirus,    Norovirus GI/GII, Rotavirus A, Sapovirus     SURGERY PROGRESS NOTE  Hospital Day #22 (1d)    Overnight, no acute events.   Pt seen and examined at bedside. The patient is laying in bed comfortably. He has no pain. He has positive bowel function. He denies any CP, SOB, fevers, chills, n/v/d      PMHx   Vital Signs Last 24 Hrs  T(C): 36.9 (04 Mar 2022 01:11), Max: 37.3 (03 Mar 2022 21:52)  T(F): 98.4 (04 Mar 2022 01:11), Max: 99.1 (03 Mar 2022 21:52)  HR: 60 (04 Mar 2022 01:11) (60 - 100)  BP: 119/81 (04 Mar 2022 01:11) (112/76 - 143/87)  RR: 18 (04 Mar 2022 01:11) (18 - 19)  SpO2: 98% (04 Mar 2022 01:11) (97% - 100%)    PHYSICAL EXAM:    General: NAD, Sitting in bed comfortably  HEENT: NC/AT, EOMI  Neck: Soft, supple  Cardio: RRR, nml S1/S2  Resp: Good effort, CTA b/l  GI/Abd: Soft, NT/ND, no rebound/guarding, no masses palpated  Musculoskeletal: All 4 extremities moving spontaneously, no limitations    MEDICATIONS:   DVT PROPHYLAXIS: enoxaparin Injectable 40 milliGRAM(s)  ANTIBIOTICS: piperacillin/tazobactam IVPB.. 3.375 Gram(s)    LAB/STUDIES:             14.8   4.73  )-----------( 255      ( 03 Mar 2022 10:55 )             43.5    138  |  105  |  11  ----------------------------<  102<H>  4.0   |  20<L>  |  0.97  Ca    9.8      03 Mar 2022 10:55  TPro  7.1  /  Alb  4.8  /  TBili  1.2  /  DBili  x   /  AST  16  /  ALT  13  /  AlkPhos  76  03-   PT/INR - ( 03 Mar 2022 13:06 )   PT: 14.3 sec;   INR: 1.23 ratio      PTT - ( 03 Mar 2022 13:06 )  PTT:33.1 sec LIVER FUNCTIONS - ( 03 Mar 2022 10:55 )  Alb: 4.8 g/dL / Pro: 7.1 g/dL / ALK PHOS: 76 U/L / ALT: 13 U/L / AST: 16 U/L / GGT: x          Urinalysis Basic - ( 03 Mar 2022 13:38 )    Color: Colorless / Appearance: Clear / S.044 / pH: x  Gluc: x / Ketone: Negative  / Bili: Negative / Urobili: Negative   Blood: x / Protein: Negative / Nitrite: Negative   Leuk Esterase: Negative / RBC: 0 /hpf / WBC 0 /HPF   Sq Epi: x / Non Sq Epi: 0 /hpf / Bacteria: 0.0    GI PCR Panel, Stool (collected 03 Mar 2022 22:00)  Source: .Stool Feces  Final Report (04 Mar 2022 02:13):    GI PCR Results: NOT detected    *******Please Note:*******    GI panel PCR evaluates for:    Campylobacter, Plesiomonas shigelloides, Salmonella,    Vibrio, Yersinia enterocolitica, Enteroaggregative    Escherichia coli (EAEC), Enteropathogenic E.coli (EPEC),    Enterotoxigenic E. coli (ETEC) lt/st, Shiga-like    toxin-producing E. coli (STEC) stx1/stx2,    Shigella/ Enteroinvasive E. coli (EIEC), Cryptosporidium,    Cyclospora cayetanensis, Entamoeba histolytica,    Giardia lamblia, Adenovirus F 40/41, Astrovirus,    Norovirus GI/GII, Rotavirus A, Sapovirus

## 2022-03-04 NOTE — PROGRESS NOTE ADULT - ASSESSMENT
26 year old, no recent travel.  looks remarkable well, normal labs and inflammatory markers.  minimal eosinophilia in outside labs.  currently asymptomatic  CT reviewed with radiology and discussed with Dr. GOMEZ.      The bowel looks normal and the air is adjacent to the ascending colon. The first concern would be IBD, but there is no diagnosis at present He does have a history of frequent bowel movements with blood in the past.    no travel    Pending      baseline QuantiFeron gold  stool for o and   P      continue zosyn  eating and feels fine  exam is benign  colonoscopy to be done in several  weeks    remaining issue is how long he needs IV ab. There does not seem to be a clear answer. If he continues to do well, it is reasonable to send out on po Cipro and metronidazole . ( Augmentin would be a second Choice)  to complete 10 days total .

## 2022-03-04 NOTE — PROGRESS NOTE ADULT - ASSESSMENT
26M presenting with ascending pneumatosis intestinalis w/ small area of contained perforation with free intraperitoneal air. Pt is stable and doing well. His pain is cotrolled and vitals stable.    PLAN:  - DVT ppx  - Continue Clear liquid diet  - IV antibiotics: Zosyn  -  Appreciate ID recs  - Appreciate GI recs  - Pain control as needed      Red surgery   [id number]

## 2022-03-04 NOTE — PROGRESS NOTE ADULT - ASSESSMENT
27 yo M w/ no PMHx p/w abd pain x 8 days, found to have pneumatosis intestinalis of ascending colon    # abd pain - likely secondary to transmural colonic process causing CT findings of pneumatosis intestinalis and pneumoperitoneum. DDx includes ischemic injury, such as isolated right sided colonic ischemia. Patient has no known risk factors for colonic ischemia. CT w/ IV contrast demonstrates patent vasculature, however a mesenteric thrombus may have been present and since resolved. However, patient's clinical stability, lack of leukocytosis/lactate, argue against isolated R sided colonic ischemia. DDx includes inflammatory conditions, such as Crohn's disease. His history of loose stools, poor weight growth, may be indicators of a chronic inflammatory condition. Patient will benefit from full colonoscopy to better visualize the area and aid in diagnosis. However, given patient's current contained perforation, with localized pneumoperitoneum, will defer endoscopic evaluation at this time.     Recommendations  - f/u surgery recommendations  - c/w abx, IVF  - f/u ID workup  - serial abd exams  - f/u inflammatory markers (ESR, CRP, fecal calprotectin)  - f/u quant  - once perforation has resolved, will wait 4-6 weeks and evaluate endoscopically   - rest of care per primary team    GI will continue to follow.     All recommendations are tentative until note is attested by attending.     Thanh Pena, PGY5  Gastroenterology/Hepatology Fellow  Available on [employer] Teams  [phone] ([employer] Short Range Pager)  [phone] (Long Range Pager)    After 5pm, please contact the on-call GI fellow. [phone]

## 2022-03-04 NOTE — PROGRESS NOTE ADULT - SUBJECTIVE AND OBJECTIVE BOX
infectious diseases progress note:    Patient is a 26y old  Male who presents with a chief complaint of Pneumatosis intestinalis w/ pneumoperitoneum (04 Mar 2022 08:34)        Other specified disorders of intestines          ROS:  CONSTITUTIONAL:  Negative fever or chills, feels well, good appetite     Allergies    No Known Allergies    Intolerances        ANTIBIOTICS/RELEVANT:  antimicrobials  piperacillin/tazobactam IVPB.. 3.375 Gram(s) IV Intermittent every 8 hours    immunologic:    OTHER:  enoxaparin Injectable 40 milliGRAM(s) SubCutaneous every 24 hours      Objective:  Vital Signs Last 24 Hrs  T(C): 36.9 (04 Mar 2022 01:11), Max: 37.3 (03 Mar 2022 21:52)  T(F): 98.4 (04 Mar 2022 01:11), Max: 99.1 (03 Mar 2022 21:52)  HR: 60 (04 Mar 2022 01:11) (60 - 100)  BP: 119/81 (04 Mar 2022 01:11) (112/76 - 143/87)  BP(mean): --  RR: 18 (04 Mar 2022 01:11) (18 - 19)  SpO2: 98% (04 Mar 2022 01:11) (97% - 100%)       Eyes:ALIREZA, EOMI  Ear/Nose/Throat: no oral lesion, no sinus tenderness on percussion	  Neck:no JVD, no lymphadenopathy, supple  Respiratory: CTA bhargavi  Cardiovascular: S1S2 RRR, no murmurs  Gastrointestinal:soft, (+) BS, no HSM  Extremities:no e/e/c        LABS:                        14.2   6.23  )-----------( 242      ( 04 Mar 2022 07:29 )             41.8     03-04    140  |  105  |  11  ----------------------------<  93  4.1   |  23  |  1.15    Ca    9.3      04 Mar 2022 07:28  Phos  4.5     03-04  Mg     1.9     03-04    TPro  6.7  /  Alb  4.5  /  TBili  1.0  /  DBili  x   /  AST  11  /  ALT  10  /  AlkPhos  71  03-04    PT/INR - ( 03 Mar 2022 13:06 )   PT: 14.3 sec;   INR: 1.23 ratio         PTT - ( 03 Mar 2022 13:06 )  PTT:33.1 sec  Urinalysis Basic - ( 03 Mar 2022 13:38 )    Color: Colorless / Appearance: Clear / S.044 / pH: x  Gluc: x / Ketone: Negative  / Bili: Negative / Urobili: Negative   Blood: x / Protein: Negative / Nitrite: Negative   Leuk Esterase: Negative / RBC: 0 /hpf / WBC 0 /HPF   Sq Epi: x / Non Sq Epi: 0 /hpf / Bacteria: 0.0          MICROBIOLOGY:    RECENT CULTURES:   @ 22:00 .Stool Feces                GI PCR Results: NOT detected  *******Please Note:*******  GI panel PCR evaluates for:  Campylobacter, Plesiomonas shigelloides, Salmonella,  Vibrio, Yersinia enterocolitica, Enteroaggregative  Escherichia coli (EAEC), Enteropathogenic E.coli (EPEC),  Enterotoxigenic E. coli (ETEC) lt/st, Shiga-like  toxin-producing E. coli (STEC) stx1/stx2,  Shigella/ Enteroinvasive E. coli (EIEC), Cryptosporidium,  Cyclospora cayetanensis, Entamoeba histolytica,  Giardia lamblia, Adenovirus F 40/41, Astrovirus,  Norovirus GI/GII, Rotavirus A, Sapovirus          RESPIRATORY CULTURES:              RADIOLOGY & ADDITIONAL STUDIES:        Pager [phone]  After 5 pm/weekends or if no response :[phone]

## 2022-03-04 NOTE — PROGRESS NOTE ADULT - SUBJECTIVE AND OBJECTIVE BOX
Gastroenterology/Hepatology Progress Note      Interval Events:   - no acute events overnight  - passing gas, no bowel movements. no abd pain  - on zosyn  - stool pcr neg    Allergies:  No Known Allergies      Hospital Medications:  enoxaparin Injectable 40 milliGRAM(s) SubCutaneous every 24 hours  piperacillin/tazobactam IVPB.. 3.375 Gram(s) IV Intermittent every 8 hours      ROS: 14 point ROS negative unless otherwise state in subjective    PHYSICAL EXAM:   Vital Signs:  Vital Signs Last 24 Hrs  T(C): 36.9 (04 Mar 2022 01:11), Max: 37.3 (03 Mar 2022 21:52)  T(F): 98.4 (04 Mar 2022 01:11), Max: 99.1 (03 Mar 2022 21:52)  HR: 60 (04 Mar 2022 01:11) (60 - 100)  BP: 119/81 (04 Mar 2022 01:11) (112/76 - 143/87)  BP(mean): --  RR: 18 (04 Mar 2022 01:11) (18 - 19)  SpO2: 98% (04 Mar 2022 01:11) (97% - 100%)  Daily Height in cm: 180.34 (03 Mar 2022 09:56)    Daily     GENERAL:  No acute distress  HEENT:  NCAT, no scleral icterus  CHEST: no resp distress  HEART:  RRR  ABDOMEN:  Soft, non-tender, non-distended, normoactive bowel sounds, no masses  EXTREMITIES:  No cyanosis, clubbing, or edema  SKIN:  No rash/erythema/ecchymoses/petechiae/wounds/abscess/warm/dry  NEURO:  Alert and oriented x 3, no asterixis, no tremor    LABS:                        14.2   6.23  )-----------( 242      ( 04 Mar 2022 07:29 )             41.8     Mean Cell Volume: 89.1 fl (-22 @ 07:29)    -    140  |  105  |  11  ----------------------------<  93  4.1   |  23  |  1.15    Ca    9.3      04 Mar 2022 07:28  Phos  4.5     03-04  Mg     1.9     -04    TPro  6.7  /  Alb  4.5  /  TBili  1.0  /  DBili  x   /  AST  11  /  ALT  10  /  AlkPhos  71  03-04    LIVER FUNCTIONS - ( 04 Mar 2022 07:28 )  Alb: 4.5 g/dL / Pro: 6.7 g/dL / ALK PHOS: 71 U/L / ALT: 10 U/L / AST: 11 U/L / GGT: x           PT/INR - ( 03 Mar 2022 13:06 )   PT: 14.3 sec;   INR: 1.23 ratio         PTT - ( 03 Mar 2022 13:06 )  PTT:33.1 sec  Urinalysis Basic - ( 03 Mar 2022 13:38 )    Color: Colorless / Appearance: Clear / S.044 / pH: x  Gluc: x / Ketone: Negative  / Bili: Negative / Urobili: Negative   Blood: x / Protein: Negative / Nitrite: Negative   Leuk Esterase: Negative / RBC: 0 /hpf / WBC 0 /HPF   Sq Epi: x / Non Sq Epi: 0 /hpf / Bacteria: 0.0      Amylase Serum--      Lipase serum17       Ammonia--        Imaging:

## 2022-03-05 ENCOUNTER — TRANSCRIPTION ENCOUNTER (OUTPATIENT)
Age: 27
End: 2022-03-05

## 2022-03-05 VITALS
HEART RATE: 80 BPM | RESPIRATION RATE: 18 BRPM | SYSTOLIC BLOOD PRESSURE: 133 MMHG | DIASTOLIC BLOOD PRESSURE: 75 MMHG | OXYGEN SATURATION: 98 % | TEMPERATURE: 98 F

## 2022-03-05 PROCEDURE — 87507 IADNA-DNA/RNA PROBE TQ 12-25: CPT

## 2022-03-05 PROCEDURE — 83690 ASSAY OF LIPASE: CPT

## 2022-03-05 PROCEDURE — 85610 PROTHROMBIN TIME: CPT

## 2022-03-05 PROCEDURE — U0003: CPT

## 2022-03-05 PROCEDURE — 85018 HEMOGLOBIN: CPT

## 2022-03-05 PROCEDURE — 74177 CT ABD & PELVIS W/CONTRAST: CPT | Mod: MA

## 2022-03-05 PROCEDURE — 82803 BLOOD GASES ANY COMBINATION: CPT

## 2022-03-05 PROCEDURE — 86850 RBC ANTIBODY SCREEN: CPT

## 2022-03-05 PROCEDURE — 85027 COMPLETE CBC AUTOMATED: CPT

## 2022-03-05 PROCEDURE — 84295 ASSAY OF SERUM SODIUM: CPT

## 2022-03-05 PROCEDURE — 96375 TX/PRO/DX INJ NEW DRUG ADDON: CPT

## 2022-03-05 PROCEDURE — 87661 TRICHOMONAS VAGINALIS AMPLIF: CPT

## 2022-03-05 PROCEDURE — 99232 SBSQ HOSP IP/OBS MODERATE 35: CPT

## 2022-03-05 PROCEDURE — 82435 ASSAY OF BLOOD CHLORIDE: CPT

## 2022-03-05 PROCEDURE — U0005: CPT

## 2022-03-05 PROCEDURE — 81001 URINALYSIS AUTO W/SCOPE: CPT

## 2022-03-05 PROCEDURE — 86753 PROTOZOA ANTIBODY NOS: CPT

## 2022-03-05 PROCEDURE — 86481 TB AG RESPONSE T-CELL SUSP: CPT

## 2022-03-05 PROCEDURE — 80053 COMPREHEN METABOLIC PANEL: CPT

## 2022-03-05 PROCEDURE — 84100 ASSAY OF PHOSPHORUS: CPT

## 2022-03-05 PROCEDURE — 83735 ASSAY OF MAGNESIUM: CPT

## 2022-03-05 PROCEDURE — 96374 THER/PROPH/DIAG INJ IV PUSH: CPT

## 2022-03-05 PROCEDURE — 99285 EMERGENCY DEPT VISIT HI MDM: CPT | Mod: 25

## 2022-03-05 PROCEDURE — 82947 ASSAY GLUCOSE BLOOD QUANT: CPT

## 2022-03-05 PROCEDURE — 85025 COMPLETE CBC W/AUTO DIFF WBC: CPT

## 2022-03-05 PROCEDURE — 84132 ASSAY OF SERUM POTASSIUM: CPT

## 2022-03-05 PROCEDURE — 85730 THROMBOPLASTIN TIME PARTIAL: CPT

## 2022-03-05 PROCEDURE — 83605 ASSAY OF LACTIC ACID: CPT

## 2022-03-05 PROCEDURE — 87177 OVA AND PARASITES SMEARS: CPT

## 2022-03-05 PROCEDURE — 85014 HEMATOCRIT: CPT

## 2022-03-05 PROCEDURE — 99232 SBSQ HOSP IP/OBS MODERATE 35: CPT | Mod: GC

## 2022-03-05 PROCEDURE — 82330 ASSAY OF CALCIUM: CPT

## 2022-03-05 PROCEDURE — 86901 BLOOD TYPING SEROLOGIC RH(D): CPT

## 2022-03-05 PROCEDURE — 86900 BLOOD TYPING SEROLOGIC ABO: CPT

## 2022-03-05 PROCEDURE — 87337 ENTAMOEB HIST GROUP AG IA: CPT

## 2022-03-05 RX ORDER — METRONIDAZOLE 500 MG
1 TABLET ORAL
Qty: 10 | Refills: 0
Start: 2022-03-05 | End: 2022-03-09

## 2022-03-05 RX ORDER — MOXIFLOXACIN HYDROCHLORIDE TABLETS, 400 MG 400 MG/1
1 TABLET, FILM COATED ORAL
Qty: 10 | Refills: 0
Start: 2022-03-05 | End: 2022-03-09

## 2022-03-05 RX ADMIN — PIPERACILLIN AND TAZOBACTAM 25 GRAM(S): 4; .5 INJECTION, POWDER, LYOPHILIZED, FOR SOLUTION INTRAVENOUS at 00:34

## 2022-03-05 RX ADMIN — ENOXAPARIN SODIUM 40 MILLIGRAM(S): 100 INJECTION SUBCUTANEOUS at 00:34

## 2022-03-05 RX ADMIN — PIPERACILLIN AND TAZOBACTAM 25 GRAM(S): 4; .5 INJECTION, POWDER, LYOPHILIZED, FOR SOLUTION INTRAVENOUS at 08:35

## 2022-03-05 NOTE — DISCHARGE NOTE PROVIDER - CARE PROVIDERS DIRECT ADDRESSES
[email or web address]

## 2022-03-05 NOTE — PROGRESS NOTE ADULT - ASSESSMENT
26M presenting with ascending pneumatosis intestinalis w/ small area of contained perforation with free intraperitoneal air. Pt is stable and doing well. His pain is cotrolled and vitals stable.    PLAN  - DVT ppx: LNX   - per ID, ABx: Zosyn  - Diet: CLD  - Ambulate as tolerated     Red surgery   [id number]   26M presenting with ascending pneumatosis intestinalis w/ small area of contained perforation with free intraperitoneal air. Pt is stable and doing well. His pain is cotrolled and vitals stable.    PLAN  - DVT ppx: LNX   - per ID, ABx: Zosyn  - Diet: LRD  - Ambulate as tolerated   - D/c home today    Red surgery   [id number]

## 2022-03-05 NOTE — PROGRESS NOTE ADULT - ASSESSMENT
26 year old, no recent travel.  looks remarkable well, normal labs and inflammatory markers.  minimal eosinophilia in outside labs.  currently asymptomatic  CT reviewed with radiology and discussed with Dr. GOMEZ.      The bowel looks normal and the air is adjacent to the ascending colon. The first concern would be IBD, but there is no diagnosis at present He does have a history of frequent bowel movements with blood in the past.    no travel    Pending      baseline QuantiFeron gold  stool for o and   P      continue zosyn  eating and feels fine  exam is benign  colonoscopy to be done in several  weeks    Ciprofloxacin 500 mg po bid + flagyl 500 mg po BID x 5 more days  Got 3 days iv abx here and not here 3 days in [place] apparently  -potential side effects of abx explained including GI, Cdiff, allergy issues, development of resisitance, etc.  -advance diet    Danny Valdez  Attending Physician   Division of Infectious Disease  Office #[phone]  Available on [employer] Teams also  After 5pm/weekend or no response, call #[phone]

## 2022-03-05 NOTE — PROGRESS NOTE ADULT - SUBJECTIVE AND OBJECTIVE BOX
SURGERY PROGRESS NOTE  Hospital Day #22 (2d)    Overnight, no acute events. Pt seen and examined at bedside.  No complaints.  Pain controlled.  Denies N/V.  Tolerating diet.  Passing flatus and BM.      PMHx   Vital Signs Last 24 Hrs  T(C): 37 (05 Mar 2022 00:42), Max: 37 (05 Mar 2022 00:42)  T(F): 98.6 (05 Mar 2022 00:42), Max: 98.6 (05 Mar 2022 00:42)  HR: 60 (05 Mar 2022 00:42) (58 - 71)  BP: 113/73 (05 Mar 2022 00:42) (113/73 - 137/92)  RR: 18 (05 Mar 2022 00:42) (18 - 18)  SpO2: 99% (05 Mar 2022 00:42) (97% - 99%)    PHYSICAL EXAM:    General: NAD, Sitting in bed comfortably  HEENT: NC/AT, EOMI  Neck: Soft, supple  Cardio: RRR, nml S1/S2  Resp: Good effort, CTA b/l  GI/Abd: Soft, NT/ND, no rebound/guarding, no masses palpated  Musculoskeletal: All 4 extremities moving spontaneously, no limitations    MEDICATIONS:   DVT PROPHYLAXIS: enoxaparin Injectable 40 milliGRAM(s)  ANTIBIOTICS: piperacillin/tazobactam IVPB.. 3.375 Gram(s)    LAB/STUDIES:             14.2   6.23  )-----------( 242      ( 04 Mar 2022 07:29 )             41.8   140  |  105  |  11  ----------------------------<  93  4.1   |  23  |  1.15  Ca    9.3      04 Mar 2022 07:28  Phos  4.5     03-04  Mg     1.9     03-04    TPro  6.7  /  Alb  4.5  /  TBili  1.0  /  DBili  x   /  AST  11  /  ALT  10  /  AlkPhos  71  03-04   PT/INR - ( 03 Mar 2022 13:06 )   PT: 14.3 sec;   INR: 1.23 ratio       PTT - ( 03 Mar 2022 13:06 )  PTT:33.1 sec LIVER FUNCTIONS - ( 04 Mar 2022 07:28 )  Alb: 4.5 g/dL / Pro: 6.7 g/dL / ALK PHOS: 71 U/L / ALT: 10 U/L / AST: 11 U/L / GGT: x          Urinalysis Basic - ( 03 Mar 2022 13:38 )    Color: Colorless / Appearance: Clear / S.044 / pH: x  Gluc: x / Ketone: Negative  / Bili: Negative / Urobili: Negative   Blood: x / Protein: Negative / Nitrite: Negative   Leuk Esterase: Negative / RBC: 0 /hpf / WBC 0 /HPF   Sq Epi: x / Non Sq Epi: 0 /hpf / Bacteria: 0.0          GI PCR Panel, Stool (collected 03 Mar 2022 22:00)  Source: .Stool Feces  Final Report (04 Mar 2022 02:13):    GI PCR Results: NOT detected    *******Please Note:*******    GI panel PCR evaluates for:    Campylobacter, Plesiomonas shigelloides, Salmonella,    Vibrio, Yersinia enterocolitica, Enteroaggregative    Escherichia coli (EAEC), Enteropathogenic E.coli (EPEC),    Enterotoxigenic E. coli (ETEC) lt/st, Shiga-like    toxin-producing E. coli (STEC) stx1/stx2,    Shigella/ Enteroinvasive E. coli (EIEC), Cryptosporidium,    Cyclospora cayetanensis, Entamoeba histolytica,    Giardia lamblia, Adenovirus F 40/41, Astrovirus,    Norovirus GI/GII, Rotavirus A, Sapovirus

## 2022-03-05 NOTE — DISCHARGE NOTE PROVIDER - CARE PROVIDER_API CALL
[place] Phillip Mariscal)  ColonRectal Surgery; Surgery  [address], [address]  [place] [address]  Phone: [phone]  Fax: [phone]  Established Patient  Follow Up Time: 1 month    [place])  Gastroenterology  [address], [address]  [place] [address]  Phone: [phone]  Fax: [phone]  Follow Up Time: 1 month

## 2022-03-05 NOTE — PROGRESS NOTE ADULT - SUBJECTIVE AND OBJECTIVE BOX
DARIO EUGENE 26y [id number]    Patient is a 26y old  Male who presents with a chief complaint of Pneumatosis intestinalis w/ pneumoperitoneum (05 Mar 2022 05:04)      Follow Up/CC:  ID following for    Interval History/ROS:    Allergies    No Known Allergies    Intolerances        ANTIMICROBIALS:  piperacillin/tazobactam IVPB.. 3.375 every 8 hours      MEDICATIONS  (STANDING):  enoxaparin Injectable 40 milliGRAM(s) SubCutaneous every 24 hours  piperacillin/tazobactam IVPB.. 3.375 Gram(s) IV Intermittent every 8 hours    MEDICATIONS  (PRN):        Vital Signs Last 24 Hrs  T(C): 36.4 (05 Mar 2022 05:39), Max: 37 (05 Mar 2022 00:42)  T(F): 97.5 (05 Mar 2022 05:39), Max: 98.6 (05 Mar 2022 00:42)  HR: 64 (05 Mar 2022 05:39) (58 - 71)  BP: 110/73 (05 Mar 2022 05:39) (110/73 - 137/92)  BP(mean): --  RR: 18 (05 Mar 2022 05:39) (18 - 18)  SpO2: 98% (05 Mar 2022 05:39) (97% - 99%)    CBC Full  -  ( 04 Mar 2022 07:29 )  WBC Count : 6.23 K/uL  RBC Count : 4.69 M/uL  Hemoglobin : 14.2 g/dL  Hematocrit : 41.8 %  Platelet Count - Automated : 242 K/uL  Mean Cell Volume : 89.1 fl  Mean Cell Hemoglobin : 30.3 pg  Mean Cell Hemoglobin Concentration : 34.0 gm/dL  Auto Neutrophil # : x  Auto Lymphocyte # : x  Auto Monocyte # : x  Auto Eosinophil # : x  Auto Basophil # : x  Auto Neutrophil % : x  Auto Lymphocyte % : x  Auto Monocyte % : x  Auto Eosinophil % : x  Auto Basophil % : x    03-04    140  |  105  |  11  ----------------------------<  93  4.1   |  23  |  1.15    Ca    9.3      04 Mar 2022 07:28  Phos  4.5     03-04  Mg     1.9     03-04    TPro  6.7  /  Alb  4.5  /  TBili  1.0  /  DBili  x   /  AST  11  /  ALT  10  /  AlkPhos  71  03-04    LIVER FUNCTIONS - ( 04 Mar 2022 07:28 )  Alb: 4.5 g/dL / Pro: 6.7 g/dL / ALK PHOS: 71 U/L / ALT: 10 U/L / AST: 11 U/L / GGT: x           Urinalysis Basic - ( 03 Mar 2022 13:38 )    Color: Colorless / Appearance: Clear / S.044 / pH: x  Gluc: x / Ketone: Negative  / Bili: Negative / Urobili: Negative   Blood: x / Protein: Negative / Nitrite: Negative   Leuk Esterase: Negative / RBC: 0 /hpf / WBC 0 /HPF   Sq Epi: x / Non Sq Epi: 0 /hpf / Bacteria: 0.0        MICROBIOLOGY:  .Stool Feces  22   Testing in progress  --  --      .Stool Feces  22   GI PCR Results: NOT detected  *******Please Note:*******  GI panel PCR evaluates for:  Campylobacter, Plesiomonas shigelloides, Salmonella,  Vibrio, Yersinia enterocolitica, Enteroaggregative  Escherichia coli (EAEC), Enteropathogenic E.coli (EPEC),  Enterotoxigenic E. coli (ETEC) lt/st, Shiga-like  toxin-producing E. coli (STEC) stx1/stx2,  Shigella/ Enteroinvasive E. coli (EIEC), Cryptosporidium,  Cyclospora cayetanensis, Entamoeba histolytica,  Giardia lamblia, Adenovirus F 40/41, Astrovirus,  Norovirus GI/GII, Rotavirus A, Sapovirus  --  --              v            RADIOLOGY     DARIO EUGENE 26y [id number]    Patient is a 26y old  Male who presents with a chief complaint of Pneumatosis intestinalis w/ pneumoperitoneum (05 Mar 2022 05:04)      Follow Up/CC:  ID following for pneumatosis     Interval History/ROS: no fever/abd pain, tolerating po     Allergies    No Known Allergies    Intolerances        ANTIMICROBIALS:  piperacillin/tazobactam IVPB.. 3.375 every 8 hours      MEDICATIONS  (STANDING):  enoxaparin Injectable 40 milliGRAM(s) SubCutaneous every 24 hours  piperacillin/tazobactam IVPB.. 3.375 Gram(s) IV Intermittent every 8 hours    MEDICATIONS  (PRN):        Vital Signs Last 24 Hrs  T(C): 36.4 (05 Mar 2022 05:39), Max: 37 (05 Mar 2022 00:42)  T(F): 97.5 (05 Mar 2022 05:39), Max: 98.6 (05 Mar 2022 00:42)  HR: 64 (05 Mar 2022 05:39) (58 - 71)  BP: 110/73 (05 Mar 2022 05:39) (110/73 - 137/92)  BP(mean): --  RR: 18 (05 Mar 2022 05:39) (18 - 18)  SpO2: 98% (05 Mar 2022 05:39) (97% - 99%)    CBC Full  -  ( 04 Mar 2022 07:29 )  WBC Count : 6.23 K/uL  RBC Count : 4.69 M/uL  Hemoglobin : 14.2 g/dL  Hematocrit : 41.8 %  Platelet Count - Automated : 242 K/uL  Mean Cell Volume : 89.1 fl  Mean Cell Hemoglobin : 30.3 pg  Mean Cell Hemoglobin Concentration : 34.0 gm/dL  Auto Neutrophil # : x  Auto Lymphocyte # : x  Auto Monocyte # : x  Auto Eosinophil # : x  Auto Basophil # : x  Auto Neutrophil % : x  Auto Lymphocyte % : x  Auto Monocyte % : x  Auto Eosinophil % : x  Auto Basophil % : x    03-04    140  |  105  |  11  ----------------------------<  93  4.1   |  23  |  1.15    Ca    9.3      04 Mar 2022 07:28  Phos  4.5     03-04  Mg     1.9     03-04    TPro  6.7  /  Alb  4.5  /  TBili  1.0  /  DBili  x   /  AST  11  /  ALT  10  /  AlkPhos  71  03-04    LIVER FUNCTIONS - ( 04 Mar 2022 07:28 )  Alb: 4.5 g/dL / Pro: 6.7 g/dL / ALK PHOS: 71 U/L / ALT: 10 U/L / AST: 11 U/L / GGT: x           Urinalysis Basic - ( 03 Mar 2022 13:38 )    Color: Colorless / Appearance: Clear / S.044 / pH: x  Gluc: x / Ketone: Negative  / Bili: Negative / Urobili: Negative   Blood: x / Protein: Negative / Nitrite: Negative   Leuk Esterase: Negative / RBC: 0 /hpf / WBC 0 /HPF   Sq Epi: x / Non Sq Epi: 0 /hpf / Bacteria: 0.0        MICROBIOLOGY:  .Stool Feces  22   Testing in progress  --  --      .Stool Feces  22   GI PCR Results: NOT detected  *******Please Note:*******  GI panel PCR evaluates for:  Campylobacter, Plesiomonas shigelloides, Salmonella,  Vibrio, Yersinia enterocolitica, Enteroaggregative  Escherichia coli (EAEC), Enteropathogenic E.coli (EPEC),  Enterotoxigenic E. coli (ETEC) lt/st, Shiga-like  toxin-producing E. coli (STEC) stx1/stx2,  Shigella/ Enteroinvasive E. coli (EIEC), Cryptosporidium,  Cyclospora cayetanensis, Entamoeba histolytica,  Giardia lamblia, Adenovirus F 40/41, Astrovirus,  Norovirus GI/GII, Rotavirus A, Sapovirus  --  --              v            RADIOLOGY

## 2022-03-05 NOTE — PROGRESS NOTE ADULT - ASSESSMENT
27 yo M w/ no PMHx p/w abd pain x 8 days, found to have pneumatosis intestinalis of ascending colon    # abd pain - likely secondary to transmural colonic process causing CT findings of pneumatosis intestinalis and pneumoperitoneum. DDx includes ischemic injury, such as isolated right sided colonic ischemia. Patient has no known risk factors for colonic ischemia. CT w/ IV contrast demonstrates patent vasculature, however a mesenteric thrombus may have been present and since resolved. However, patient's clinical stability, lack of leukocytosis/lactate, argue against isolated R sided colonic ischemia. DDx includes inflammatory conditions, such as Crohn's disease. His history of loose stools, poor weight growth, may be indicators of a chronic inflammatory condition. Patient will benefit from full colonoscopy to better visualize the area and aid in diagnosis. However, given patient's current contained perforation, with localized pneumoperitoneum, will defer endoscopic evaluation at this time.     Recommendations  - f/u surgery recommendations  - c/w abx, IVF  - f/u ID workup  - serial abd exams  - f/u inflammatory markers (ESR, CRP, fecal calprotectin)  - f/u quant  - once perforation has resolved, will wait 4-6 weeks and evaluate endoscopically   - rest of care per primary team    GI will continue to follow.     All recommendations are tentative until note is attested by attending.       Thank you for involving us in the care of this patient, please reach out if any further questions.     Armani Kirkland MD  Gastroenterology/Hepatology Fellow, PGY5    Available on [employer] Teams  [phone] ([facility])  [id number] ([facility])  Please contact on call fellow weekdays after 5pm-7am and weekends: [phone]

## 2022-03-05 NOTE — DISCHARGE NOTE PROVIDER - PROVIDER TOKENS
PROVIDER:[TOKEN:[[id number]],FOLLOWUP:[1 month],ESTABLISHEDPATIENT:[T]],PROVIDER:[TOKEN:[[id number]],FOLLOWUP:[1 month]]

## 2022-03-05 NOTE — DISCHARGE NOTE NURSING/CASE MANAGEMENT/SOCIAL WORK - PATIENT PORTAL LINK FT
You can access the FollowMyHealth Patient Portal offered by [facility] by registering at the following website: [email or web address]. By joining [employer]’s FollowMyHealth portal, you will also be able to view your health information using other applications (apps) compatible with our system.

## 2022-03-05 NOTE — DISCHARGE NOTE NURSING/CASE MANAGEMENT/SOCIAL WORK - NSDCPEFALRISK_GEN_ALL_CORE
For information on Fall & Injury Prevention, visit: [email or web address] OR  [email or web address] OR  https://www.cdc.gov/steadi/patient.html

## 2022-03-05 NOTE — DISCHARGE NOTE PROVIDER - NSDCCPCAREPLAN_GEN_ALL_CORE_FT
PRINCIPAL DISCHARGE DIAGNOSIS  Diagnosis: Pneumatosis intestinalis  Assessment and Plan of Treatment:

## 2022-03-05 NOTE — DISCHARGE NOTE PROVIDER - HOSPITAL COURSE
26M w/ no PMHx who presents after being andmitted at a [place] hospital for a 9 day history of abdominal pain and blood per rectum. Patient states he has had a previous episode ~2.5y ago of blood per rectum, however it was attributed to an external hemorrhoid. This time, his abdominal pain persisted, thus he visited a local hospital in [place] 2 days ago where they found him to have pneumatosis intestinalis and small pneumoperitoneum. Patient returned to [place] and now presented to the ED for further management. In the ED, patient was afebrile, hemodynamically stable, labs largely unremarkable, CTAP revealed findings consistent with CT imaging at [place], which was pneumatosis intestinalis in ascending colon with a small contained area of pneumoperitoneum. Surgery contacted for evaluation. Admitted to surgery for evaluation and to watch. ID and GI were contacted and evaluated patient. Patient was initially put on zosyn and GI recommended outpatient eval endoscopically in 4-6 weeks. Patient tolerating diet with no issues and is stable to discharge 3 days after admission.

## 2022-03-05 NOTE — PROGRESS NOTE ADULT - SUBJECTIVE AND OBJECTIVE BOX
*incomplete note, full to follow*    Gastroenterology/Hepatology Progress Note      Interval Events:       Allergies:  No Known Allergies      Hospital Medications:  enoxaparin Injectable 40 milliGRAM(s) SubCutaneous every 24 hours  piperacillin/tazobactam IVPB.. 3.375 Gram(s) IV Intermittent every 8 hours      ROS: 14 point ROS negative unless otherwise state in subjective    PHYSICAL EXAM:   Vital Signs Last 24 Hrs  T(C): 37 (05 Mar 2022 00:42), Max: 37 (05 Mar 2022 00:42)  T(F): 98.6 (05 Mar 2022 00:42), Max: 98.6 (05 Mar 2022 00:42)  HR: 60 (05 Mar 2022 00:42) (58 - 71)  BP: 113/73 (05 Mar 2022 00:42) (113/73 - 137/92)  BP(mean): --  RR: 18 (05 Mar 2022 00:42) (18 - 18)  SpO2: 99% (05 Mar 2022 00:42) (97% - 99%)     GENERAL:  No acute distress  HEENT:  NCAT, no scleral icterus  CHEST: no resp distress  HEART:  RRR  ABDOMEN:  Soft, non-tender, non-distended, normoactive bowel sounds, no masses  EXTREMITIES:  No cyanosis, clubbing, or edema  SKIN:  No rash/erythema/ecchymoses/petechiae/wounds/abscess/warm/dry  NEURO:  Alert and oriented x 3, no asterixis, no tremor    LABS:                        14.2   6.23  )-----------( 242      ( 04 Mar 2022 07:29 )             41.8     03-04    140  |  105  |  11  ----------------------------<  93  4.1   |  23  |  1.15    Ca    9.3      04 Mar 2022 07:28  Phos  4.5     03-04  Mg     1.9     03-04    TPro  6.7  /  Alb  4.5  /  TBili  1.0  /  DBili  x   /  AST  11  /  ALT  10  /  AlkPhos  71  03-04    LIVER FUNCTIONS - ( 04 Mar 2022 07:28 )  Alb: 4.5 g/dL / Pro: 6.7 g/dL / ALK PHOS: 71 U/L / ALT: 10 U/L / AST: 11 U/L / GGT: x           PT/INR - ( 03 Mar 2022 13:06 )   PT: 14.3 sec;   INR: 1.23 ratio         PTT - ( 03 Mar 2022 13:06 )  PTT:33.1 sec  Urinalysis Basic - ( 03 Mar 2022 13:38 )    Color: Colorless / Appearance: Clear / S.044 / pH: x  Gluc: x / Ketone: Negative  / Bili: Negative / Urobili: Negative   Blood: x / Protein: Negative / Nitrite: Negative   Leuk Esterase: Negative / RBC: 0 /hpf / WBC 0 /HPF   Sq Epi: x / Non Sq Epi: 0 /hpf / Bacteria: 0.0          Imaging:

## 2022-03-05 NOTE — DISCHARGE NOTE PROVIDER - NSDCFUADDINST_GEN_ALL_CORE_FT
Patient to be discharged home with 5 days of cipro and 5 days of metronidazole that he should finish. Then plan to follow up with gastroenterology for endoscopy in 4 weeks.

## 2022-03-05 NOTE — PROGRESS NOTE ADULT - REASON FOR ADMISSION
Pneumatosis intestinalis w/ pneumoperitoneum

## 2022-03-06 LAB
CULTURE RESULTS: SIGNIFICANT CHANGE UP
SPECIMEN SOURCE: SIGNIFICANT CHANGE UP

## 2022-03-07 LAB — E HISTOLYT AB SER-ACNC: NEGATIVE — SIGNIFICANT CHANGE UP

## 2022-03-09 LAB — E HISTOLYT AG STL IA-ACNC: SIGNIFICANT CHANGE UP

## 2022-03-10 LAB
GAMMA INTERFERON BACKGROUND BLD IA-ACNC: 0.01 IU/ML — SIGNIFICANT CHANGE UP
M TB IFN-G BLD-IMP: NEGATIVE — SIGNIFICANT CHANGE UP
M TB IFN-G CD4+ BCKGRND COR BLD-ACNC: -0.01 IU/ML — SIGNIFICANT CHANGE UP
M TB IFN-G CD4+CD8+ BCKGRND COR BLD-ACNC: 0 IU/ML — SIGNIFICANT CHANGE UP
QUANT TB PLUS MITOGEN MINUS NIL: 9.99 IU/ML — SIGNIFICANT CHANGE UP

## 2022-03-11 ENCOUNTER — NON-APPOINTMENT (OUTPATIENT)
Age: 27
End: 2022-03-11

## 2022-03-11 ENCOUNTER — APPOINTMENT (OUTPATIENT)
Dept: GASTROENTEROLOGY | Facility: CLINIC | Age: 27
End: 2022-03-11
Payer: COMMERCIAL

## 2022-03-11 VITALS
OXYGEN SATURATION: 98 % | WEIGHT: 130 LBS | SYSTOLIC BLOOD PRESSURE: 108 MMHG | DIASTOLIC BLOOD PRESSURE: 60 MMHG | HEIGHT: 71 IN | TEMPERATURE: 97.7 F | BODY MASS INDEX: 18.2 KG/M2 | HEART RATE: 100 BPM

## 2022-03-11 PROCEDURE — 99214 OFFICE O/P EST MOD 30 MIN: CPT

## 2022-03-11 RX ORDER — SODIUM SULFATE, POTASSIUM SULFATE, MAGNESIUM SULFATE 17.5; 3.13; 1.6 G/ML; G/ML; G/ML
17.5-3.13-1.6 SOLUTION, CONCENTRATE ORAL
Qty: 1 | Refills: 0 | Status: ACTIVE | COMMUNITY
Start: 2022-03-11 | End: 1900-01-01

## 2022-03-11 NOTE — REASON FOR VISIT
[Post Hospitalization] : a post hospitalization visit [Parent] : parent [FreeTextEntry1] : Abnormal CT scan of the abdomen, abdominal pain, colonic perforation

## 2022-03-11 NOTE — ASSESSMENT
[FreeTextEntry1] : 26-year-old male recent hospitalization for colonic inflammation, suspected perforation\par Etiology unclear, cannot rule out inflammatory bowel disease, Crohn's disease, diverticular perforation, less likely foreign body ingestion with perforation\par Low suspicion but cannot rule out neoplasm\par \par Plan\par Continue low residue diet\par Indications risks benefits and alternatives to colonoscopy reviewed with patient and his parents\par Examination planned in approximately 6 weeks\par Patient agreeable\par Patient understands to contact me in the interim if any significant new complaints develop

## 2022-03-11 NOTE — HISTORY OF PRESENT ILLNESS
[de-identified] : 26-year-old male, developed abdominal pain while visiting [place] \par Hospitalized following CT scan showing ascending colon inflammation, possible perforation\par Some improvement, left [place] return to [place], rehospitalized\par Repeat CAT scan of the abdomen and pelvis showed pneumatosis and small pockets of pneumoperitoneum, admitted to surgery, treated with Zosyn\par Resolution of abdominal pain\par Negative amoeba serology, normal stool studies\par \par Seen today for follow-up, off antibiotics\par Regular bowel habit without bleeding\par Patient does chronically have urgency, stools after eating\par Has not been losing weight, but has always had difficulty gaining weight\par \par Patient denies any chronic abdominal pains or any significant prodrome to recent events\par Patient does have a history of prior GI bleeding, sigmoidoscopy, possible polypectomy, no reports of colitis or Crohn's, report unavailable\par \par

## 2022-03-11 NOTE — PHYSICAL EXAM
[General Appearance - Alert] : alert [General Appearance - In No Acute Distress] : in no acute distress [Sclera] : the sclera and conjunctiva were normal [PERRL With Normal Accommodation] : pupils were equal in size, round, and reactive to light [Extraocular Movements] : extraocular movements were intact [Outer Ear] : the ears and nose were normal in appearance [Oropharynx] : the oropharynx was normal [Neck Appearance] : the appearance of the neck was normal [Neck Cervical Mass (___cm)] : no neck mass was observed [Jugular Venous Distention Increased] : there was no jugular-venous distention [Thyroid Diffuse Enlargement] : the thyroid was not enlarged [Thyroid Nodule] : there were no palpable thyroid nodules [Auscultation Breath Sounds / Voice Sounds] : lungs were clear to auscultation bilaterally [Heart Rate And Rhythm] : heart rate was normal and rhythm regular [Heart Sounds] : normal S1 and S2 [Heart Sounds Gallop] : no gallops [Murmurs] : no murmurs [Heart Sounds Pericardial Friction Rub] : no pericardial rub [Edema] : there was no peripheral edema [Bowel Sounds] : normal bowel sounds [Abdomen Soft] : soft [Abdomen Tenderness] : non-tender [Abdomen Mass (___ Cm)] : no abdominal mass palpated [] : no hepato-splenomegaly [Cervical Lymph Nodes Enlarged Posterior Bilaterally] : posterior cervical [Cervical Lymph Nodes Enlarged Anterior Bilaterally] : anterior cervical [Supraclavicular Lymph Nodes Enlarged Bilaterally] : supraclavicular [Axillary Lymph Nodes Enlarged Bilaterally] : axillary [Femoral Lymph Nodes Enlarged Bilaterally] : femoral [Inguinal Lymph Nodes Enlarged Bilaterally] : inguinal [No CVA Tenderness] : no ~M costovertebral angle tenderness [No Spinal Tenderness] : no spinal tenderness [Nail Clubbing] : no clubbing  or cyanosis of the fingernails [Abnormal Walk] : normal gait [Musculoskeletal - Swelling] : no joint swelling seen [Motor Tone] : muscle strength and tone were normal [Oriented To Time, Place, And Person] : oriented to person, place, and time [Impaired Insight] : insight and judgment were intact [Affect] : the affect was normal

## 2022-04-18 ENCOUNTER — OUTPATIENT (OUTPATIENT)
Dept: OUTPATIENT SERVICES | Facility: HOSPITAL | Age: 27
LOS: 1 days | End: 2022-04-18
Payer: COMMERCIAL

## 2022-04-18 ENCOUNTER — APPOINTMENT (OUTPATIENT)
Dept: GASTROENTEROLOGY | Facility: HOSPITAL | Age: 27
End: 2022-04-18

## 2022-04-18 ENCOUNTER — RESULT REVIEW (OUTPATIENT)
Age: 27
End: 2022-04-18

## 2022-04-18 ENCOUNTER — TRANSCRIPTION ENCOUNTER (OUTPATIENT)
Age: 27
End: 2022-04-18

## 2022-04-18 VITALS
RESPIRATION RATE: 19 BRPM | OXYGEN SATURATION: 100 % | SYSTOLIC BLOOD PRESSURE: 114 MMHG | DIASTOLIC BLOOD PRESSURE: 70 MMHG | HEART RATE: 70 BPM

## 2022-04-18 VITALS
TEMPERATURE: 97 F | HEART RATE: 82 BPM | OXYGEN SATURATION: 99 % | SYSTOLIC BLOOD PRESSURE: 122 MMHG | DIASTOLIC BLOOD PRESSURE: 78 MMHG | HEIGHT: 71 IN | WEIGHT: 136.91 LBS | RESPIRATION RATE: 20 BRPM

## 2022-04-18 DIAGNOSIS — R93.5 ABNORMAL FINDINGS ON DIAGNOSTIC IMAGING OF OTHER ABDOMINAL REGIONS, INCLUDING RETROPERITONEUM: ICD-10-CM

## 2022-04-18 DIAGNOSIS — Z98.890 OTHER SPECIFIED POSTPROCEDURAL STATES: Chronic | ICD-10-CM

## 2022-04-18 PROCEDURE — 88305 TISSUE EXAM BY PATHOLOGIST: CPT

## 2022-04-18 PROCEDURE — 88305 TISSUE EXAM BY PATHOLOGIST: CPT | Mod: 26

## 2022-04-18 PROCEDURE — 45378 DIAGNOSTIC COLONOSCOPY: CPT

## 2022-04-18 PROCEDURE — 45380 COLONOSCOPY AND BIOPSY: CPT

## 2022-04-18 RX ORDER — SODIUM CHLORIDE 9 MG/ML
500 INJECTION INTRAMUSCULAR; INTRAVENOUS; SUBCUTANEOUS
Refills: 0 | Status: DISCONTINUED | OUTPATIENT
Start: 2022-04-18 | End: 2022-05-02

## 2022-04-18 NOTE — ASU PATIENT PROFILE, ADULT - FALL HARM RISK - PATIENT NEEDS ASSISTANCE

## 2022-04-18 NOTE — ASU DISCHARGE PLAN (ADULT/PEDIATRIC) - NS MD DC FALL RISK RISK
For information on Fall & Injury Prevention, visit: [email or web address] OR  [email or web address] OR  https://www.cdc.gov/steadi/patient.html

## 2022-04-18 NOTE — PRE PROCEDURE NOTE - PRE PROCEDURE EVALUATION
Attending Physician:        Los Fournier MD                    Procedure:    Indication for Procedure: abnormal CT, rule out Crohn's disease  ________________________________________________________  PAST MEDICAL & SURGICAL HISTORY:  No pertinent past medical history    H/O left wrist surgery      ALLERGIES:  No Known Allergies    HOME MEDICATIONS:    AICD/PPM: [ ] yes   [x ] no    PERTINENT LAB DATA:                      PHYSICAL EXAMINATION:    T(C): --  HR: --  BP: --  RR: --  SpO2: --    Constitutional: NAD  HEENT: PERRLA, EOMI,    Neck:  No JVD  Respiratory: CTAB/L  Cardiovascular: S1 and S2  Gastrointestinal: BS+, soft, NT/ND  Extremities: No peripheral edema  Neurological: A/O x 3, no focal deficits  Psychiatric: Normal mood, normal affect  Skin: No rashes    ASA Class: I [ ]  II [x ]  III [ ]  IV [ ]    COMMENTS:    The patient is a suitable candidate for the planned procedure unless box checked [ ]  No, explain:

## 2022-04-19 LAB — SURGICAL PATHOLOGY STUDY: SIGNIFICANT CHANGE UP

## 2022-04-21 ENCOUNTER — NON-APPOINTMENT (OUTPATIENT)
Age: 27
End: 2022-04-21

## 2022-04-21 DIAGNOSIS — R10.9 UNSPECIFIED ABDOMINAL PAIN: ICD-10-CM

## 2022-04-21 DIAGNOSIS — R93.5 ABNORMAL FINDINGS ON DIAGNOSTIC IMAGING OF OTHER ABDOMINAL REGIONS, INCLUDING RETROPERITONEUM: ICD-10-CM

## 2022-05-02 PROBLEM — Z78.9 OTHER SPECIFIED HEALTH STATUS: Chronic | Status: ACTIVE | Noted: 2022-04-18

## 2022-05-04 ENCOUNTER — APPOINTMENT (OUTPATIENT)
Dept: MRI IMAGING | Facility: CLINIC | Age: 27
End: 2022-05-04
Payer: COMMERCIAL

## 2022-05-04 ENCOUNTER — OUTPATIENT (OUTPATIENT)
Dept: OUTPATIENT SERVICES | Facility: HOSPITAL | Age: 27
LOS: 1 days | End: 2022-05-04

## 2022-05-04 DIAGNOSIS — Z98.890 OTHER SPECIFIED POSTPROCEDURAL STATES: Chronic | ICD-10-CM

## 2022-05-04 PROCEDURE — 72197 MRI PELVIS W/O & W/DYE: CPT | Mod: 26

## 2022-05-04 PROCEDURE — 74183 MRI ABD W/O CNTR FLWD CNTR: CPT | Mod: 26

## 2022-07-28 LAB — SARS-COV-2 N GENE NPH QL NAA+PROBE: NOT DETECTED

## 2023-02-07 ENCOUNTER — NON-APPOINTMENT (OUTPATIENT)
Age: 28
End: 2023-02-07

## 2023-02-13 NOTE — ED ADULT NURSE NOTE - SUICIDE SCREENING QUESTION 1
Check anemia labs  
Discussed seeing a therapist for counseling and patient would like to do this. Referral placed.   
PT order placed again  
Reviewed management  
No
